# Patient Record
Sex: MALE | URBAN - METROPOLITAN AREA
[De-identification: names, ages, dates, MRNs, and addresses within clinical notes are randomized per-mention and may not be internally consistent; named-entity substitution may affect disease eponyms.]

---

## 2020-11-10 ENCOUNTER — OFFICE (OUTPATIENT)
Dept: URBAN - METROPOLITAN AREA TELEHEALTH 12 | Facility: TELEHEALTH | Age: 80
End: 2020-11-10
Payer: COMMERCIAL

## 2020-11-10 VITALS — HEIGHT: 63 IN | WEIGHT: 130 LBS

## 2020-11-10 DIAGNOSIS — R06.6 HICCOUGH: ICD-10-CM

## 2020-11-10 PROCEDURE — 99203 OFFICE O/P NEW LOW 30 MIN: CPT | Performed by: INTERNAL MEDICINE

## 2020-11-10 RX ORDER — BACLOFEN 10 MG/1
TABLET ORAL
Qty: 60 | Refills: 1 | Status: ACTIVE
Start: 2020-11-10

## 2020-11-10 NOTE — SERVICEHPINOTES
PATIENT VERIFIED BY DATE OF BIRTH AND NAME. Patient has been consented for this phone visit.   79 yo developed persistent hiccups, burping and regurgitation since Sunday.  No problems swallowing solids but he has liquid regurgitation.     No food impaction.  He is reluctant to eat due to his hiccups.  No nausea, vomiting, chest pain, dyspnea, abdominal pain, constipation, diarrhea.